# Patient Record
Sex: FEMALE | Race: WHITE | NOT HISPANIC OR LATINO | URBAN - METROPOLITAN AREA
[De-identification: names, ages, dates, MRNs, and addresses within clinical notes are randomized per-mention and may not be internally consistent; named-entity substitution may affect disease eponyms.]

---

## 2021-06-09 ENCOUNTER — TELEPHONE ENCOUNTER (OUTPATIENT)
Dept: URBAN - METROPOLITAN AREA SURGERY CENTER 25 | Facility: SURGERY CENTER | Age: 51
End: 2021-06-09

## 2021-06-09 VITALS — WEIGHT: 160 LBS | HEIGHT: 55 IN | BODY MASS INDEX: 37.03 KG/M2

## 2021-06-09 RX ORDER — MULTIVITAMIN
1 TABLET TABLET ORAL ONCE A DAY
Status: ACTIVE | COMMUNITY

## 2021-06-09 RX ORDER — SODIUM, POTASSIUM,MAG SULFATES 17.5-3.13G
AS DIRECTED SOLUTION, RECONSTITUTED, ORAL ORAL
OUTPATIENT
Start: 2021-06-09

## 2021-06-09 RX ORDER — INSULIN GLARGINE 100 [IU]/ML
AS DIRECTED INJECTION, SOLUTION SUBCUTANEOUS
Status: ACTIVE | COMMUNITY

## 2021-06-09 RX ORDER — METFORMIN HYDROCHLORIDE 1000 MG/1
1 TABLET WITH A MEAL TABLET, FILM COATED ORAL ONCE A DAY
Status: ACTIVE | COMMUNITY

## 2021-06-09 RX ORDER — GLIMEPIRIDE 4 MG/1
1 TABLET WITH BREAKFAST OR THE FIRST MAIN MEAL OF THE DAY TABLET ORAL ONCE A DAY
Status: ACTIVE | COMMUNITY

## 2021-06-09 RX ORDER — ALLOPURINOL 100 MG/1
1 TABLET TABLET ORAL ONCE A DAY
Status: ACTIVE | COMMUNITY

## 2021-06-09 RX ORDER — GLIMEPIRIDE 1 MG/1
1 TABLET WITH BREAKFAST OR THE FIRST MAIN MEAL OF THE DAY TABLET ORAL ONCE A DAY
Status: ACTIVE | COMMUNITY

## 2021-06-24 ENCOUNTER — OFFICE VISIT (OUTPATIENT)
Dept: URBAN - METROPOLITAN AREA SURGERY CENTER 25 | Facility: SURGERY CENTER | Age: 51
End: 2021-06-24
Payer: COMMERCIAL

## 2021-06-24 DIAGNOSIS — Z80.0 FAMILY HISTORY MALIGNANT NEOPLASM OF BILIARY TRACT: ICD-10-CM

## 2021-06-24 DIAGNOSIS — Z12.11 COLON CANCER SCREENING: ICD-10-CM

## 2021-06-24 PROCEDURE — G8907 PT DOC NO EVENTS ON DISCHARG: HCPCS | Performed by: INTERNAL MEDICINE

## 2021-06-24 PROCEDURE — 45378 DIAGNOSTIC COLONOSCOPY: CPT | Performed by: INTERNAL MEDICINE

## 2021-06-24 RX ORDER — INSULIN GLARGINE 100 [IU]/ML
AS DIRECTED INJECTION, SOLUTION SUBCUTANEOUS
Status: ACTIVE | COMMUNITY

## 2021-06-24 RX ORDER — GLIMEPIRIDE 4 MG/1
1 TABLET WITH BREAKFAST OR THE FIRST MAIN MEAL OF THE DAY TABLET ORAL ONCE A DAY
Status: ACTIVE | COMMUNITY

## 2021-06-24 RX ORDER — MULTIVITAMIN
1 TABLET TABLET ORAL ONCE A DAY
Status: ACTIVE | COMMUNITY

## 2021-06-24 RX ORDER — SODIUM, POTASSIUM,MAG SULFATES 17.5-3.13G
AS DIRECTED SOLUTION, RECONSTITUTED, ORAL ORAL
Status: ACTIVE | COMMUNITY
Start: 2021-06-09

## 2021-06-24 RX ORDER — GLIMEPIRIDE 1 MG/1
1 TABLET WITH BREAKFAST OR THE FIRST MAIN MEAL OF THE DAY TABLET ORAL ONCE A DAY
Status: ACTIVE | COMMUNITY

## 2021-06-24 RX ORDER — METFORMIN HYDROCHLORIDE 1000 MG/1
1 TABLET WITH A MEAL TABLET, FILM COATED ORAL ONCE A DAY
Status: ACTIVE | COMMUNITY

## 2021-06-24 RX ORDER — ALLOPURINOL 100 MG/1
1 TABLET TABLET ORAL ONCE A DAY
Status: ACTIVE | COMMUNITY

## 2021-07-16 ENCOUNTER — OFFICE VISIT (OUTPATIENT)
Dept: URBAN - METROPOLITAN AREA CLINIC 113 | Facility: CLINIC | Age: 51
End: 2021-07-16

## 2021-09-13 ENCOUNTER — DASHBOARD ENCOUNTERS (OUTPATIENT)
Age: 51
End: 2021-09-13

## 2021-09-13 ENCOUNTER — OFFICE VISIT (OUTPATIENT)
Dept: URBAN - METROPOLITAN AREA CLINIC 113 | Facility: CLINIC | Age: 51
End: 2021-09-13
Payer: COMMERCIAL

## 2021-09-13 VITALS
RESPIRATION RATE: 18 BRPM | WEIGHT: 158 LBS | TEMPERATURE: 98.2 F | HEART RATE: 98 BPM | HEIGHT: 55 IN | BODY MASS INDEX: 36.57 KG/M2

## 2021-09-13 DIAGNOSIS — K57.30 COLON, DIVERTICULOSIS: ICD-10-CM

## 2021-09-13 DIAGNOSIS — Z80.0 FAMILY HISTORY OF COLON CANCER IN FATHER: ICD-10-CM

## 2021-09-13 DIAGNOSIS — K64.8 INTERNAL HEMORRHOID: ICD-10-CM

## 2021-09-13 DIAGNOSIS — K59.09 CHRONIC CONSTIPATION: ICD-10-CM

## 2021-09-13 PROBLEM — 236069009: Status: ACTIVE | Noted: 2021-09-13

## 2021-09-13 PROBLEM — 733657002: Status: ACTIVE | Noted: 2021-09-13

## 2021-09-13 PROBLEM — 90458007: Status: ACTIVE | Noted: 2021-09-13

## 2021-09-13 PROBLEM — 312824007: Status: ACTIVE | Noted: 2021-09-13

## 2021-09-13 PROCEDURE — 99213 OFFICE O/P EST LOW 20 MIN: CPT | Performed by: INTERNAL MEDICINE

## 2021-09-13 RX ORDER — METFORMIN HYDROCHLORIDE 1000 MG/1
1 TABLET WITH A MEAL TABLET, FILM COATED ORAL ONCE A DAY
Status: ACTIVE | COMMUNITY

## 2021-09-13 RX ORDER — ALLOPURINOL 100 MG/1
1 TABLET TABLET ORAL ONCE A DAY
Status: ACTIVE | COMMUNITY

## 2021-09-13 RX ORDER — MULTIVITAMIN
1 TABLET TABLET ORAL ONCE A DAY
Status: ACTIVE | COMMUNITY

## 2021-09-13 RX ORDER — INSULIN GLARGINE 100 [IU]/ML
AS DIRECTED INJECTION, SOLUTION SUBCUTANEOUS
Status: ACTIVE | COMMUNITY

## 2021-09-13 RX ORDER — GLIMEPIRIDE 4 MG/1
1 TABLET WITH BREAKFAST OR THE FIRST MAIN MEAL OF THE DAY TABLET ORAL ONCE A DAY
Status: ACTIVE | COMMUNITY

## 2021-09-13 RX ORDER — SODIUM, POTASSIUM,MAG SULFATES 17.5-3.13G
AS DIRECTED SOLUTION, RECONSTITUTED, ORAL ORAL
Status: DISCONTINUED | COMMUNITY
Start: 2021-06-09

## 2021-09-13 RX ORDER — GLIMEPIRIDE 1 MG/1
1 TABLET WITH BREAKFAST OR THE FIRST MAIN MEAL OF THE DAY TABLET ORAL ONCE A DAY
Status: DISCONTINUED | COMMUNITY

## 2021-09-13 NOTE — HPI-OTHER HISTORIES
Colonoscopy 6/24/2021:BBPS 9, nonbleeding grade 2 internal hemorrhoids, sigmoid/descending/transverse diverticulosis, otherwise normal.  No specimens were collected.

## 2021-09-13 NOTE — HPI-TODAY'S VISIT:
This is a 51-year-old female with a family history of colon cancer in a distant relative prior to age 60 presenting for follow-up after a colonoscopy. Her father had colon cancer at the age of 53.  Her mother had lung cancer.  She has a history of constipation reporting a bowel movement every 2 or 3 days.  She reports hard stools that require straining.  She denies any other abdominal symptoms.